# Patient Record
Sex: FEMALE | Race: ASIAN | NOT HISPANIC OR LATINO | URBAN - METROPOLITAN AREA
[De-identification: names, ages, dates, MRNs, and addresses within clinical notes are randomized per-mention and may not be internally consistent; named-entity substitution may affect disease eponyms.]

---

## 2021-09-17 ENCOUNTER — APPOINTMENT (RX ONLY)
Dept: URBAN - METROPOLITAN AREA CLINIC 42 | Facility: CLINIC | Age: 41
Setting detail: DERMATOLOGY
End: 2021-09-17

## 2021-09-17 DIAGNOSIS — Z41.9 ENCOUNTER FOR PROCEDURE FOR PURPOSES OTHER THAN REMEDYING HEALTH STATE, UNSPECIFIED: ICD-10-CM

## 2021-09-17 PROCEDURE — ? COSMETIC CONSULTATION: BOTOX

## 2021-09-17 PROCEDURE — ? BOTOX

## 2021-09-17 NOTE — PROCEDURE: BOTOX
[place] Units: 0
Show Glabellar Units: Yes
Show Right And Left Brow Units: No
Lot #: [id number]
Price (Use Numbers Only, No Special Characters Or $): 1032 [facility]
Post-Care Instructions: Patient instructed to not lie down for 4 hours and limit physical activity for 24 hours.
Forehead Units: 12
Expiration Date (Month Year): 11/2023
Consent: Written consent obtained. Risks include but not limited to lid/brow ptosis, bruising, swelling, diplopia, temporary effect, incomplete chemical denervation.
Glabellar Complex Units: 9
Detail Level: Detailed
Dilution (U/0.1 Cc): 3.5

## 2021-09-17 NOTE — PROCEDURE: COSMETIC CONSULTATION: BOTOX
Detail Level: Detailed
Price (Use Numbers Only, No Special Characters Or $): 1036 [facility]
